# Patient Record
Sex: FEMALE | Race: WHITE | NOT HISPANIC OR LATINO
[De-identification: names, ages, dates, MRNs, and addresses within clinical notes are randomized per-mention and may not be internally consistent; named-entity substitution may affect disease eponyms.]

---

## 2022-06-27 PROBLEM — Z00.00 ENCOUNTER FOR PREVENTIVE HEALTH EXAMINATION: Status: ACTIVE | Noted: 2022-06-27

## 2022-07-05 DIAGNOSIS — Z86.39 PERSONAL HISTORY OF OTHER ENDOCRINE, NUTRITIONAL AND METABOLIC DISEASE: ICD-10-CM

## 2022-07-05 DIAGNOSIS — Z87.2 PERSONAL HISTORY OF DISEASES OF THE SKIN AND SUBCUTANEOUS TISSUE: ICD-10-CM

## 2022-07-05 DIAGNOSIS — Z86.79 PERSONAL HISTORY OF OTHER DISEASES OF THE CIRCULATORY SYSTEM: ICD-10-CM

## 2022-07-05 RX ORDER — FEXOFENADINE HCL 60 MG
TABLET ORAL
Refills: 0 | Status: ACTIVE | COMMUNITY

## 2022-07-06 ENCOUNTER — APPOINTMENT (OUTPATIENT)
Dept: SURGERY | Facility: CLINIC | Age: 52
End: 2022-07-06

## 2022-07-06 ENCOUNTER — LABORATORY RESULT (OUTPATIENT)
Age: 52
End: 2022-07-06

## 2022-07-06 VITALS
HEIGHT: 64.5 IN | WEIGHT: 134 LBS | TEMPERATURE: 97 F | BODY MASS INDEX: 22.6 KG/M2 | DIASTOLIC BLOOD PRESSURE: 88 MMHG | HEART RATE: 74 BPM | SYSTOLIC BLOOD PRESSURE: 125 MMHG

## 2022-07-06 DIAGNOSIS — Z78.9 OTHER SPECIFIED HEALTH STATUS: ICD-10-CM

## 2022-07-06 DIAGNOSIS — Z86.39 PERSONAL HISTORY OF OTHER ENDOCRINE, NUTRITIONAL AND METABOLIC DISEASE: ICD-10-CM

## 2022-07-06 PROCEDURE — 99204 OFFICE O/P NEW MOD 45 MIN: CPT | Mod: 25

## 2022-07-06 PROCEDURE — 10005 FNA BX W/US GDN 1ST LES: CPT

## 2022-07-06 RX ORDER — FLUTICASONE PROPIONATE 50 UG/1
50 SPRAY, METERED NASAL
Qty: 16 | Refills: 0 | Status: ACTIVE | COMMUNITY
Start: 2022-01-26

## 2022-07-06 RX ORDER — AMLODIPINE BESYLATE 10 MG/1
10 TABLET ORAL
Qty: 90 | Refills: 0 | Status: ACTIVE | COMMUNITY
Start: 2022-04-15

## 2022-07-06 RX ORDER — AMLODIPINE BESYLATE 10 MG/1
10 TABLET ORAL
Refills: 0 | Status: ACTIVE | COMMUNITY

## 2022-07-06 NOTE — PROCEDURE
[FreeTextEntry1] : After informed consent was signed by the patient and witness by the medical assistant, an ultrasound was used to identify the nodule of interest. The skin was prepped with alcohol. Using a 25G needle under US guidance, a fine needle aspiration was performed of the hypoechoic 1.3cm right thyroid nodule. The nodule was biopsied with four different passes. The specimen from the first three passes was placed on a labeled slide as well as rinsed in Cytolyt solution and the fourth pass was used for Thyroseq analysis reserved for indeterminate nodules. Hemostasis was confirmed. A small band-aid was placed on the neck. The patient tolerated the procedure well.

## 2022-07-07 ENCOUNTER — NON-APPOINTMENT (OUTPATIENT)
Age: 52
End: 2022-07-07

## 2022-07-08 NOTE — ASSESSMENT
Event Note/Fall [FreeTextEntry1] : 52 yo F with primary hyperparathyroidism and incidentally found 1.3cm right thyroid nodule, mildly suspicious. \par \par We had a long discussion about thyroid anatomy, physiology and pathophysiology. We discussed the role of fine needle aspiration biopsies, their interpretation and management. I have recommended an FNA biopsy of the nodule today.\par \par We also discussed the diagnosis of primary hyperparathyroidism and its manifestations as single and double adenomas as well as 4-gland hyperplasia. We spoke about the utility of localization studies and their role in operative planning in terms of focused parathyroidectomy and 4-gland exploration. We discussed intraoperative PTH and nerve monitoring. We discussed the indications for surgery and the risks of surgery including bleeding, infection, recurrence of disease, injury to surrounding structures. We also discussed postop recovery including pain control, activity, diet and possible need for temporary calcium supplementation.\par \par The patient understands all this and would like to proceed. She is considering mid-August.\par \par Will plan for parathyroidectomy once localization studies are completed and thyroid biopsy results are available as the extent of surgery would be different pending FNA results. \par \par Surgery will be under general anesthesia with intraoperative nerve and PTH monitoring at [facility] as an ambulatory procedure.\par \par Patient would need to obtain medical clearance prior to surgery. \par \par \par \par

## 2022-07-08 NOTE — ADDENDUM
[FreeTextEntry1] : Patient:   Marivel Swain   Accession:                             [id number]  Collected Date/Time:                   7/6/2022 12:06 EDT Received Date/Time:                    7/7/2022 01:31 EDT  Fine Needle Aspiration Report - Auth (Verified)  Specimen(s) Submitted  THYROID, RIGHT MID, FNA  Final Diagnosis THYROID, RIGHT MID, FNA BENIGN FINDINGS (Category II). Nodular goiter.   Moderately cellular specimen consisting of bland follicular cells, in a  background of macrophages and colloid.  The [facility] System for Reporting Thyroid Cytopathology: Implied Risk of  Malignancy and Recommended Clinical Management**  Diagnostic Category          Risk of Malig   Usual Management*               asuncion (%) I. Non Diagnostic                  5-10      Repeat FNA with US guidance II. Benign                          0-3      Clinical follow up III. Atypia of Undetermined                  Repeat FNA , molecular test  Significance or Follicu      10-30          ing, or lobectomy lar Lesion of Undetermined  Significance IV. Follicular neoplasm  or        25-40     Molecular testing, lobectomy       Suspicious for Fol licular Neoplasm V. Suspicious for Malignan         50-75     Near-total thyroidectomy or cy                                            lobectomy VI. Malignant                      97-99     Near-total thyroidectomy or                                lobectomy

## 2022-07-08 NOTE — ASSESSMENT
[FreeTextEntry1] : 50 yo F with primary hyperparathyroidism and incidentally found 1.3cm right thyroid nodule, mildly suspicious. \par \par We had a long discussion about thyroid anatomy, physiology and pathophysiology. We discussed the role of fine needle aspiration biopsies, their interpretation and management. I have recommended an FNA biopsy of the nodule today.\par \par We also discussed the diagnosis of primary hyperparathyroidism and its manifestations as single and double adenomas as well as 4-gland hyperplasia. We spoke about the utility of localization studies and their role in operative planning in terms of focused parathyroidectomy and 4-gland exploration. We discussed intraoperative PTH and nerve monitoring. We discussed the indications for surgery and the risks of surgery including bleeding, infection, recurrence of disease, injury to surrounding structures. We also discussed postop recovery including pain control, activity, diet and possible need for temporary calcium supplementation.\par \par The patient understands all this and would like to proceed. She is considering mid-August.\par \par Will plan for parathyroidectomy once localization studies are completed and thyroid biopsy results are available as the extent of surgery would be different pending FNA results. \par \par Surgery will be under general anesthesia with intraoperative nerve and PTH monitoring at [facility] as an ambulatory procedure.\par \par Patient would need to obtain medical clearance prior to surgery. \par \par \par \par

## 2022-07-08 NOTE — PHYSICAL EXAM
[Respiratory Effort] : normal respiratory effort [Normal Rate and Rhythm] : normal rate and rhythm [No Rash or Lesion] : No rash or lesion [Alert] : alert [Calm] : calm [de-identified] : NAD, well-appearing [de-identified] : Anicteric [de-identified] : normal thyroid; bedside US showed a hypoechoic right midpole lateral nodule with a small hypoechoic lesion on the left below the thyroid gland, possibly a parathyroid adenoma candidate. [de-identified] : soft, nondistended

## 2022-07-08 NOTE — ADDENDUM
[FreeTextEntry1] : Patient:   Marivel Swain   Accession:                             [id number]  Collected Date/Time:                   7/6/2022 12:06 EDT Received Date/Time:                    7/7/2022 01:31 EDT  Fine Needle Aspiration Report - Auth (Verified)  Specimen(s) Submitted  THYROID, RIGHT MID, FNA  Final Diagnosis THYROID, RIGHT MID, FNA BENIGN FINDINGS (Category II). Nodular goiter.   Moderately cellular specimen consisting of bland follicular cells, in a  background of macrophages and colloid.  The [place] System for Reporting Thyroid Cytopathology: Implied Risk of  Malignancy and Recommended Clinical Management**  Diagnostic Category          Risk of Malig   Usual Management*               asuncion (%) I. Non Diagnostic                  5-10      Repeat FNA with US guidance II. Benign                          0-3      Clinical follow up III. Atypia of Undetermined                  Repeat FNA , molecular test  Significance or Follicu      10-30          ing, or lobectomy lar Lesion of Undetermined  Significance IV. Follicular neoplasm  or        25-40     Molecular testing, lobectomy       Suspicious for Fol licular Neoplasm V. Suspicious for Malignan         50-75     Near-total thyroidectomy or cy                                            lobectomy VI. Malignant                      97-99     Near-total thyroidectomy or                                lobectomy

## 2022-07-08 NOTE — CONSULT LETTER
[Dear  ___] : Dear  [unfilled], [( Thank you for referring [unfilled] for consultation for _____ )] : Thank you for referring [unfilled] for consultation for [unfilled] [Please see my note below.] : Please see my note below. [Consult Closing:] : Thank you very much for allowing me to participate in the care of this patient.  If you have any questions, please do not hesitate to contact me. [Sincerely,] : Sincerely, [DrAntonio  ___] : Dr. EDWARDS [FreeTextEntry3] : Fiordaliza Marina MD

## 2022-07-08 NOTE — PHYSICAL EXAM
[Respiratory Effort] : normal respiratory effort [Normal Rate and Rhythm] : normal rate and rhythm [No Rash or Lesion] : No rash or lesion [Alert] : alert [Calm] : calm [de-identified] : NAD, well-appearing [de-identified] : Anicteric [de-identified] : normal thyroid; bedside US showed a hypoechoic right midpole lateral nodule with a small hypoechoic lesion on the left below the thyroid gland, possibly a parathyroid adenoma candidate. [de-identified] : soft, nondistended

## 2022-07-08 NOTE — HISTORY OF PRESENT ILLNESS
[de-identified] : 50 yo F referred by Dr. Dexter for evaluation of primary hyperparathyroidism and right thyroid nodule. The patient has been told of elevated  calcium for many years. She was referred to Dr. Dexter for further evaluation this year. Her calcium has been in the 10.1-11.4 range over the last 5 years with the most recent 10.7 with PTH of 52.5 in March 2022. Her VIt D was 33.9. 24h Urine calcium was 496. DEXA showed osteopenia in L FN (T-1), L hip (T-0.9), and L spine (T-1.2). Thyroid US was performed which did not identify a parathyroid gland but did note a hypoechoic 1.3x0.7x0.9cm right midpole nodule, TR4. \par The patient reports no history of kidney stones, but does report some mental fogginess, fatigue, frequent thirst/urination, constipation. She has had no fractures. There is no family history of endocrine disease except her father with DM. She has no symptoms of mass effect. She has a 4D parathyroid CT scheduled for tomorrow.

## 2022-07-08 NOTE — HISTORY OF PRESENT ILLNESS
[de-identified] : 52 yo F referred by Dr. Dexter for evaluation of primary hyperparathyroidism and right thyroid nodule. The patient has been told of elevated  calcium for many years. She was referred to Dr. Dexter for further evaluation this year. Her calcium has been in the 10.1-11.4 range over the last 5 years with the most recent 10.7 with PTH of 52.5 in March 2022. Her VIt D was 33.9. 24h Urine calcium was 496. DEXA showed osteopenia in L FN (T-1), L hip (T-0.9), and L spine (T-1.2). Thyroid US was performed which did not identify a parathyroid gland but did note a hypoechoic 1.3x0.7x0.9cm right midpole nodule, TR4. \par The patient reports no history of kidney stones, but does report some mental fogginess, fatigue, frequent thirst/urination, constipation. She has had no fractures. There is no family history of endocrine disease except her father with DM. She has no symptoms of mass effect. She has a 4D parathyroid CT scheduled for tomorrow.

## 2022-08-25 ENCOUNTER — RESULT REVIEW (OUTPATIENT)
Age: 52
End: 2022-08-25

## 2022-08-25 ENCOUNTER — APPOINTMENT (OUTPATIENT)
Dept: SURGERY | Facility: HOSPITAL | Age: 52
End: 2022-08-25

## 2022-08-28 ENCOUNTER — NON-APPOINTMENT (OUTPATIENT)
Age: 52
End: 2022-08-28

## 2022-09-07 ENCOUNTER — APPOINTMENT (OUTPATIENT)
Dept: SURGERY | Facility: CLINIC | Age: 52
End: 2022-09-07

## 2022-09-07 ENCOUNTER — NON-APPOINTMENT (OUTPATIENT)
Age: 52
End: 2022-09-07

## 2022-09-07 VITALS
SYSTOLIC BLOOD PRESSURE: 114 MMHG | HEART RATE: 81 BPM | BODY MASS INDEX: 23.05 KG/M2 | HEIGHT: 64 IN | TEMPERATURE: 97.9 F | WEIGHT: 135 LBS | RESPIRATION RATE: 16 BRPM | DIASTOLIC BLOOD PRESSURE: 82 MMHG

## 2022-09-07 PROCEDURE — 99024 POSTOP FOLLOW-UP VISIT: CPT

## 2022-09-08 LAB
25(OH)D3 SERPL-MCNC: 41 NG/ML
CALCIUM SERPL-MCNC: 10.2 MG/DL
CALCIUM SERPL-MCNC: 10.2 MG/DL
PARATHYROID HORMONE INTACT: 24 PG/ML

## 2022-09-08 NOTE — PHYSICAL EXAM
[Respiratory Effort] : normal respiratory effort [Normal Rate and Rhythm] : normal rate and rhythm [No Rash or Lesion] : No rash or lesion [Alert] : alert [Calm] : calm [de-identified] : NAD, well-appearing [de-identified] : Anicteric [de-identified] : well-healing neck incision c/d/i

## 2022-09-08 NOTE — CONSULT LETTER
[Dear  ___] : Dear  [unfilled], [Courtesy Letter:] : I had the pleasure of seeing your patient, [unfilled], in my office today. [Please see my note below.] : Please see my note below. [Consult Closing:] : Thank you very much for allowing me to participate in the care of this patient.  If you have any questions, please do not hesitate to contact me. [Sincerely,] : Sincerely, [DrAntonio  ___] : Dr. EDWARDS [FreeTextEntry1] : Enclosed please find my operative report, pathology report and postop visit note.  [FreeTextEntry3] : Fiordaliza Marina MD

## 2022-09-08 NOTE — HISTORY OF PRESENT ILLNESS
[de-identified] : Patient returns s/p excision of right inferior parathyroid adenoma. Her PTH went from 59 to 20 ten minutes after excision. The gland was 110 milligrams. She is doing well without any concerns. She has no hypocalcemic symptoms and her voice and swallowing are normal.

## 2022-09-08 NOTE — ASSESSMENT
[FreeTextEntry1] : 52 yo F s/p parathyroidectomy. Recovering well without any concerns.\par \par Check Ca/PTH today\par Return in 6 months and will also obtain US to monitor thyroid nodule at that time.\par F/U with Dr. Dexter.

## 2022-09-10 ENCOUNTER — TRANSCRIPTION ENCOUNTER (OUTPATIENT)
Age: 52
End: 2022-09-10

## 2023-01-29 ENCOUNTER — RESULT REVIEW (OUTPATIENT)
Age: 53
End: 2023-01-29

## 2023-02-06 ENCOUNTER — APPOINTMENT (OUTPATIENT)
Dept: SURGERY | Facility: CLINIC | Age: 53
End: 2023-02-06
Payer: COMMERCIAL

## 2023-02-06 VITALS
TEMPERATURE: 98.1 F | HEART RATE: 75 BPM | SYSTOLIC BLOOD PRESSURE: 115 MMHG | HEIGHT: 65 IN | DIASTOLIC BLOOD PRESSURE: 80 MMHG | WEIGHT: 137 LBS | BODY MASS INDEX: 22.82 KG/M2

## 2023-02-06 DIAGNOSIS — E04.1 NONTOXIC SINGLE THYROID NODULE: ICD-10-CM

## 2023-02-06 DIAGNOSIS — E21.0 PRIMARY HYPERPARATHYROIDISM: ICD-10-CM

## 2023-02-06 PROCEDURE — 99213 OFFICE O/P EST LOW 20 MIN: CPT

## 2023-02-07 PROBLEM — E21.0 PRIMARY HYPERPARATHYROIDISM: Status: ACTIVE | Noted: 2022-07-06

## 2023-02-07 PROBLEM — E04.1 RIGHT THYROID NODULE: Status: ACTIVE | Noted: 2022-07-06

## 2023-02-07 RX ORDER — CLOTRIMAZOLE AND BETAMETHASONE DIPROPIONATE 10; .5 MG/G; MG/G
1-0.05 CREAM TOPICAL
Qty: 45 | Refills: 0 | Status: ACTIVE | COMMUNITY
Start: 2023-02-03

## 2023-02-07 NOTE — CONSULT LETTER
[Dear  ___] : Dear  [unfilled], [Courtesy Letter:] : I had the pleasure of seeing your patient, [unfilled], in my office today. [Please see my note below.] : Please see my note below. [Consult Closing:] : Thank you very much for allowing me to participate in the care of this patient.  If you have any questions, please do not hesitate to contact me. [Sincerely,] : Sincerely, [FreeTextEntry3] : Fiordaliza Marina MD  [DrAntonio  ___] : Dr. EDWARDS

## 2023-02-07 NOTE — REVIEW OF SYSTEMS
[As Noted in HPI] : as noted in HPI [Negative] : Gastrointestinal [Sore Throat] : no sore throat [Hoarseness] : no hoarseness

## 2023-02-07 NOTE — PHYSICAL EXAM
[Respiratory Effort] : normal respiratory effort [Normal Rate and Rhythm] : normal rate and rhythm [No Rash or Lesion] : No rash or lesion [Alert] : alert [Calm] : calm [de-identified] : NAD, well-appearing  [de-identified] : well-healed neck incision c/d/i speaking in a normal voice

## 2023-02-07 NOTE — ASSESSMENT
[FreeTextEntry1] : 53 yo F s/p parathyroidectomy with right thyroid nodule.\par \par Parathyroid labs are normal. Patient shows no evidence of recurrence or persistent disease.\par \par Thyroid nodule is relatively stable with normal biopsy. \par \par Recommend follow up US in 1 year.

## 2023-02-07 NOTE — HISTORY OF PRESENT ILLNESS
[de-identified] : Patient returns for follow up s/p parathyroidectomy. She also has a 1.3 cm nodule on the right thyroid that was benign on FNA in July. She underwent follow up US recently in January 2023 which showed minimal enlargement of the nodule to 1.5 x 0.7x0.9cm. She has no new symptoms and is otherwise feeling well. Recent labs with Dr. Alfredo showed Ca 10.1, PTH 30.7, VIt D 39.6.